# Patient Record
Sex: FEMALE | Race: BLACK OR AFRICAN AMERICAN | Employment: UNEMPLOYED | URBAN - METROPOLITAN AREA
[De-identification: names, ages, dates, MRNs, and addresses within clinical notes are randomized per-mention and may not be internally consistent; named-entity substitution may affect disease eponyms.]

---

## 2020-10-31 ENCOUNTER — HOSPITAL ENCOUNTER (OUTPATIENT)
Age: 30
Setting detail: OBSERVATION
Discharge: HOME | End: 2020-11-01
Attending: EMERGENCY MEDICINE | Admitting: INTERNAL MEDICINE
Payer: COMMERCIAL

## 2020-10-31 PROBLEM — R51.9 HEADACHE: Status: ACTIVE | Noted: 2020-10-31

## 2020-10-31 LAB
ANION GAP SERPL CALCULATED.3IONS-SCNC: 10 MMOL/L (ref 3–16)
BACTERIA: ABNORMAL /HPF
BASOPHILS ABSOLUTE: 0 K/UL (ref 0–0.2)
BASOPHILS RELATIVE PERCENT: 0.5 %
BILIRUBIN URINE: NEGATIVE
BLOOD, URINE: NEGATIVE
BUN BLDV-MCNC: 5 MG/DL (ref 7–20)
CALCIUM SERPL-MCNC: 9.3 MG/DL (ref 8.3–10.6)
CHLORIDE BLD-SCNC: 101 MMOL/L (ref 99–110)
CLARITY: CLEAR
CO2: 23 MMOL/L (ref 21–32)
COLOR: YELLOW
CREAT SERPL-MCNC: <0.5 MG/DL (ref 0.6–1.1)
EOSINOPHILS ABSOLUTE: 0.2 K/UL (ref 0–0.6)
EOSINOPHILS RELATIVE PERCENT: 1.9 %
GFR AFRICAN AMERICAN: >60
GFR NON-AFRICAN AMERICAN: >60
GLUCOSE BLD-MCNC: 79 MG/DL (ref 70–99)
GLUCOSE URINE: NEGATIVE MG/DL
HCT VFR BLD CALC: 39.2 % (ref 36–48)
HEMOGLOBIN: 13.4 G/DL (ref 12–16)
KETONES, URINE: 40 MG/DL
LEUKOCYTE ESTERASE, URINE: ABNORMAL
LYMPHOCYTES ABSOLUTE: 3.1 K/UL (ref 1–5.1)
LYMPHOCYTES RELATIVE PERCENT: 38 %
MCH RBC QN AUTO: 32.5 PG (ref 26–34)
MCHC RBC AUTO-ENTMCNC: 34.3 G/DL (ref 31–36)
MCV RBC AUTO: 94.6 FL (ref 80–100)
MICROSCOPIC EXAMINATION: YES
MONOCYTES ABSOLUTE: 0.4 K/UL (ref 0–1.3)
MONOCYTES RELATIVE PERCENT: 5.4 %
NEUTROPHILS ABSOLUTE: 4.5 K/UL (ref 1.7–7.7)
NEUTROPHILS RELATIVE PERCENT: 54.2 %
NITRITE, URINE: NEGATIVE
PDW BLD-RTO: 11.6 % (ref 12.4–15.4)
PH UA: 6.5 (ref 5–8)
PLATELET # BLD: 242 K/UL (ref 135–450)
PMV BLD AUTO: 9.4 FL (ref 5–10.5)
POTASSIUM REFLEX MAGNESIUM: 3.7 MMOL/L (ref 3.5–5.1)
PROTEIN UA: NEGATIVE MG/DL
RBC # BLD: 4.14 M/UL (ref 4–5.2)
RBC UA: ABNORMAL /HPF (ref 0–4)
SODIUM BLD-SCNC: 134 MMOL/L (ref 136–145)
SPECIFIC GRAVITY UA: 1.01 (ref 1–1.03)
URINE TYPE: ABNORMAL
UROBILINOGEN, URINE: 0.2 E.U./DL
WBC # BLD: 8.3 K/UL (ref 4–11)
WBC UA: ABNORMAL /HPF (ref 0–5)

## 2020-10-31 PROCEDURE — 2580000003 HC RX 258: Performed by: STUDENT IN AN ORGANIZED HEALTH CARE EDUCATION/TRAINING PROGRAM

## 2020-10-31 PROCEDURE — 96374 THER/PROPH/DIAG INJ IV PUSH: CPT

## 2020-10-31 PROCEDURE — 80048 BASIC METABOLIC PNL TOTAL CA: CPT

## 2020-10-31 PROCEDURE — 96361 HYDRATE IV INFUSION ADD-ON: CPT

## 2020-10-31 PROCEDURE — 85025 COMPLETE CBC W/AUTO DIFF WBC: CPT

## 2020-10-31 PROCEDURE — 6360000002 HC RX W HCPCS: Performed by: STUDENT IN AN ORGANIZED HEALTH CARE EDUCATION/TRAINING PROGRAM

## 2020-10-31 PROCEDURE — G0378 HOSPITAL OBSERVATION PER HR: HCPCS

## 2020-10-31 PROCEDURE — 81001 URINALYSIS AUTO W/SCOPE: CPT

## 2020-10-31 PROCEDURE — 99285 EMERGENCY DEPT VISIT HI MDM: CPT

## 2020-10-31 RX ORDER — METOCLOPRAMIDE HYDROCHLORIDE 5 MG/ML
10 INJECTION INTRAMUSCULAR; INTRAVENOUS ONCE
Status: COMPLETED | OUTPATIENT
Start: 2020-10-31 | End: 2020-10-31

## 2020-10-31 RX ORDER — SODIUM CHLORIDE, SODIUM LACTATE, POTASSIUM CHLORIDE, CALCIUM CHLORIDE 600; 310; 30; 20 MG/100ML; MG/100ML; MG/100ML; MG/100ML
1000 INJECTION, SOLUTION INTRAVENOUS ONCE
Status: COMPLETED | OUTPATIENT
Start: 2020-10-31 | End: 2020-10-31

## 2020-10-31 RX ORDER — UREA 10 %
LOTION (ML) TOPICAL
COMMUNITY

## 2020-10-31 RX ADMIN — METOCLOPRAMIDE 10 MG: 5 INJECTION, SOLUTION INTRAMUSCULAR; INTRAVENOUS at 21:18

## 2020-10-31 RX ADMIN — SODIUM CHLORIDE, POTASSIUM CHLORIDE, SODIUM LACTATE AND CALCIUM CHLORIDE 1000 ML: 600; 310; 30; 20 INJECTION, SOLUTION INTRAVENOUS at 21:19

## 2020-10-31 ASSESSMENT — PAIN DESCRIPTION - PAIN TYPE: TYPE: ACUTE PAIN

## 2020-10-31 ASSESSMENT — PAIN DESCRIPTION - ORIENTATION: ORIENTATION: LEFT

## 2020-10-31 ASSESSMENT — PAIN DESCRIPTION - DESCRIPTORS: DESCRIPTORS: THROBBING;NUMBNESS

## 2020-10-31 ASSESSMENT — PAIN DESCRIPTION - LOCATION: LOCATION: HEAD

## 2020-10-31 ASSESSMENT — PAIN SCALES - GENERAL: PAINLEVEL_OUTOF10: 10

## 2020-10-31 NOTE — ED TRIAGE NOTES
Pt to ED for headache since Wednesday. PT states she has been taking tylenol but it hasnt been working. Pt states her left side of her face is now in pain. Pt is 4 months pregnant.

## 2020-10-31 NOTE — ED PROVIDER NOTES
[id number] RESIDENT NOTE       Date of evaluation: 10/31/2020    Chief Complaint     Headache (left side of face hurts-headache started wednesday) and Dizziness      History of Present Illness     Josefina Irizarry is a 35 y.o. female  with no significant past medical history who presents for headache. Patient states that she has had a left-sided frontal headache for approximately 5 days now it has not been responsive to Tylenol at home. Initially she thought this was one of her typical headaches that are left-sided, but today it worsened and she noticed that she had some decrease sensation on her left upper face as well as pain on her left upper face. She has not experienced symptoms like this before. She has never been diagnosed with a headache disorder before. She also endorses photophobia associated with this. No phonophobia. She has not experienced any nausea or vomiting. She has not had any head trauma. Headache was not maximal at onset. She has had not had any weakness or numbness. Patient is approximately 13 weeks pregnant and she has had prior OB visits with a confirmed IUP per her report. She has plans to see an obstetrician here on Wednesday as she has recently moved from [place] and is transferring care. Patient has not yet tried any other treatment for their symptoms and nothing else seems to make them better or worse. Aside from the above, patient denies any aggravating or alleviating factors or associated symptoms. Review of Systems     Positive for: Headache  Negative for: Vision changes, hearing changes, weakness, numbness, chest pain, shortness of breath, abdominal pain, nausea, vomiting, fevers, chills, bowel or bladder changes  All other systems reviewed and negative, except as stated in HPI. Past Medical, Surgical, Family, and Social History     She has a past medical history of Asthma.   She has a past surgical history that includes [place] tooth extraction and Hand surgery (). Her family history is not on file. She reports that she has never smoked. She has never used smokeless tobacco. She reports previous alcohol use. She reports that she does not use drugs. Medications     Current Discharge Medication List      CONTINUE these medications which have NOT CHANGED    Details   Prenatal Multivit-Min-Fe-FA (PRE- PO) Take by mouth      Pyridoxine HCl (VITAMIN B6) 50 MG TABS Take by mouth      EQ BUDESONIDE NASAL NA by Nasal route             Allergies     She is allergic to iv dye [iodides]; nuts [peanut-containing drug products]; amoxicillin; adelso-d [diphenhydramine]; ceclor [cefaclor]; cefazolin; cephalosporins; and penicillins. Physical Exam     INITIAL VITALS: BP: 137/75, Temp: 98.8 °F (37.1 °C), Pulse: 88, Resp: 18, SpO2: 97 %     General: Age-appropriate female in no acute distress    HEENT:  Normocephalic, atraumatic. There is mild tenderness over the left upper face without obvious trauma. Patient reports decreased sensation, but does not report this on testing of light touch. Eyes: Anicteric, EOMI     Neck:  Supple, full ROM    Pulmonary:   CTA bl, no wheezes, rales, rhonchi    Cardiac:  Regular rate and rhythm, no m/r/g,     Abdomen:  Soft, nondistended, nontender    Extremities:  Warm, 2+ radial pulses    Skin: No rashes or bruises    Neuro:    - Alert and oriented to person, place, time and situation  - Follows commands readily  - Normal speech with no aphasia and no dysarthria  - No facial asymmetry  - Visual fields intact bilaterally  - EOMI bilaterally, PERRLA. CN V motor intact. SILT in V1-V3 distribution. and sensory intact. CN VII intact: face symmetric evidenced by smile and forehead wrinkle. Hearing intact bilaterally and symmetric. Symmetric palate raise without uvular deviation. Symmetric shoulder shrug. SCM 5/5 bilaterally. Tongue is midline.     - Symmetric 5/5 strength in the bilateral deltoids, biceps, triceps, wrist flexion/extension, , hip flexion, knee flex/ext, ankle dorsiflexion/plantarflexion.    - Sensation to soft touch intact in the upper and lower extremities bilaterally   - Finger-to-nose intact bilaterally, rapidly alternating movements intact bilaterally, no pronator drift, normal gait without ataxia, tandem gait intact   - Downgoing babinski bilaterally     Musculoskeletal: denies pain, recent fracture     Psych:   Mood and affect appropriate    DiagnosticResults     RADIOLOGY:  MRV HEAD WO CONTRAST    (Results Pending)       LABS:   Results for orders placed or performed during the hospital encounter of 10/31/20   CBC Auto Differential   Result Value Ref Range    WBC 8.3 4.0 - 11.0 K/uL    RBC 4.14 4.00 - 5.20 M/uL    Hemoglobin 13.4 12.0 - 16.0 g/dL    Hematocrit 39.2 36.0 - 48.0 %    MCV 94.6 80.0 - 100.0 fL    MCH 32.5 26.0 - 34.0 pg    MCHC 34.3 31.0 - 36.0 g/dL    RDW 11.6 (L) 12.4 - 15.4 %    Platelets 655 100 - 155 K/uL    MPV 9.4 5.0 - 10.5 fL    Neutrophils % 54.2 %    Lymphocytes % 38.0 %    Monocytes % 5.4 %    Eosinophils % 1.9 %    Basophils % 0.5 %    Neutrophils Absolute 4.5 1.7 - 7.7 K/uL    Lymphocytes Absolute 3.1 1.0 - 5.1 K/uL    Monocytes Absolute 0.4 0.0 - 1.3 K/uL    Eosinophils Absolute 0.2 0.0 - 0.6 K/uL    Basophils Absolute 0.0 0.0 - 0.2 K/uL   Basic Metabolic Panel w/ Reflex to MG   Result Value Ref Range    Sodium 134 (L) 136 - 145 mmol/L    Potassium reflex Magnesium 3.7 3.5 - 5.1 mmol/L    Chloride 101 99 - 110 mmol/L    CO2 23 21 - 32 mmol/L    Anion Gap 10 3 - 16    Glucose 79 70 - 99 mg/dL    BUN 5 (L) 7 - 20 mg/dL    CREATININE <0.5 (L) 0.6 - 1.1 mg/dL    GFR Non-African American >60 >60    GFR African American >60 >60    Calcium 9.3 8.3 - 10.6 mg/dL   Urinalysis, reflex to microscopic   Result Value Ref Range    Color, UA Yellow Straw/Yellow    Clarity, UA Clear Clear    Glucose, Ur Negative Negative mg/dL    Bilirubin Urine Negative Negative    Ketones, Urine 40 (A) Negative mg/dL    Specific Gravity, UA 1.010 1.005 - 1.030    Blood, Urine Negative Negative    pH, UA 6.5 5.0 - 8.0    Protein, UA Negative Negative mg/dL    Urobilinogen, Urine 0.2 <2.0 E.U./dL    Nitrite, Urine Negative Negative    Leukocyte Esterase, Urine TRACE (A) Negative    Microscopic Examination YES     Urine Type Voided    Microscopic Urinalysis   Result Value Ref Range    WBC, UA 0-2 0 - 5 /HPF    RBC, UA 0-2 0 - 4 /HPF    Bacteria, UA Rare (A) None Seen /HPF       RECENT VITALS:  BP: 110/76, Temp: 98.6 °F (37 °C), Pulse: 75,Resp: 18, SpO2: 98 %     ED Course     Nursing Notes, Past Medical Hx, Past Surgical Hx, Social Hx, Allergies, and Family Hx were reviewed. The patient was given the followingmedications:  Orders Placed This Encounter   Medications    metoclopramide (REGLAN) injection 10 mg    lactated ringers infusion 1,000 mL    sodium chloride flush 0.9 % injection 10 mL    sodium chloride flush 0.9 % injection 10 mL    OR Linked Order Group     acetaminophen (TYLENOL) tablet 650 mg     acetaminophen (TYLENOL) suppository 650 mg    OR Linked Order Group     promethazine (PHENERGAN) tablet 12.5 mg     ondansetron (ZOFRAN) injection 4 mg    enoxaparin (LOVENOX) injection 40 mg       CONSULTS:  IP CONSULT TO HOSPITALIST  IP CONSULT TO OB GYN    MEDICAL DECISION MAKING / ASSESSMENT / Vina Brunner is a 35 y.o. female with a history and presentation as described above in HPI. The patient was evaluated by myself and the ED Attending Physician, Dr. Francisco Crum. All management and disposition plans were discussed and agreed upon. Appropriate labs and diagnostic studies were reviewed as they were made available. Pertinent laboratory studies in medical decision making are listed below. Appropriate labs and diagnostic studies were reviewed as they were made available. Pertinent laboratory studies in medical decision making are listed below. Upon presentation, the patient was evaluated by me. Patient who is approximately 13 weeks pregnant presents today with 5 days of left-sided headache that has worsened and now involves photophobia, subjective left-sided facial numbness, left-sided facial pain. She has had left-sided headaches like this in the past but they have never lasted this long or involved other symptoms. She is trying Tylenol at home. Here she is hemodynamically stable and afebrile. Neurologic exam is benign making stroke less likely. Patient did not have a headache that was maximal at onset making subarachnoid hemorrhage less likely. She has no fever, leukocytosis, meningismus to suggest meningitis. Vision is intact. Renal panel here was unremarkable and CBC did not demonstrate leukocytosis or anemia. Urinalysis was also obtained that was not consistent with urinary tract infection. Given the patient's presentation, pregnancy, location of the headache, and atypical headache there was concern for venous sinus thrombosis. Discussed with patient that she has a listed allergy to contrast media and she states that she had an anaphylactic reaction to a CT scan with contrast approximately 1 year ago. I contacted radiology and it was determined that MRI venogram could not be obtained until the morning. At this time the patient will be admitted to hospital medicine for monitoring and MRI in the morning. I contacted gynecology and made them aware that the patient was here. I also obtain fetal heart tones at 151 bpm.  I ordered the MRI prior to admission and the hospitalist will follow this up. The emergency department the patient was treated with fluids and Reglan for headache with mild improvement, but not complete resolution. She had taken Tylenol less than 1 hour prior to arrival so this was not given.     The patient's ultimate disposition was: admission    At this time the patient has been admitted to hospital medicine for further evaluation and management of headache, MRI in AM. The patient will continue to be monitored here in the emergency department until which time she is moved to her new treatment location. Clinical Impression     1. Acute intractable headache, unspecified headache type        Disposition     PATIENT REFERRED TO:  No follow-up provider specified.     DISCHARGE MEDICATIONS:  Current Discharge Medication List          DISPOSITION Admitted 10/31/2020 11:21:52 PM      MD Chitra Oquendo MD  11/01/20 0740

## 2020-11-01 ENCOUNTER — APPOINTMENT (OUTPATIENT)
Dept: MRI IMAGING | Age: 30
End: 2020-11-01
Payer: COMMERCIAL

## 2020-11-01 VITALS
HEART RATE: 79 BPM | TEMPERATURE: 98.5 F | SYSTOLIC BLOOD PRESSURE: 110 MMHG | OXYGEN SATURATION: 100 % | HEIGHT: 63 IN | BODY MASS INDEX: 33.66 KG/M2 | RESPIRATION RATE: 16 BRPM | WEIGHT: 190 LBS | DIASTOLIC BLOOD PRESSURE: 74 MMHG

## 2020-11-01 PROBLEM — R51.9 HEADACHE: Status: RESOLVED | Noted: 2020-10-31 | Resolved: 2020-11-01

## 2020-11-01 LAB
ANION GAP SERPL CALCULATED.3IONS-SCNC: 11 MMOL/L (ref 3–16)
BASOPHILS ABSOLUTE: 0 K/UL (ref 0–0.2)
BASOPHILS RELATIVE PERCENT: 0.3 %
BUN BLDV-MCNC: 6 MG/DL (ref 7–20)
CALCIUM SERPL-MCNC: 8.7 MG/DL (ref 8.3–10.6)
CHLORIDE BLD-SCNC: 105 MMOL/L (ref 99–110)
CO2: 19 MMOL/L (ref 21–32)
CREAT SERPL-MCNC: <0.5 MG/DL (ref 0.6–1.1)
EOSINOPHILS ABSOLUTE: 0.2 K/UL (ref 0–0.6)
EOSINOPHILS RELATIVE PERCENT: 3.1 %
GFR AFRICAN AMERICAN: >60
GFR NON-AFRICAN AMERICAN: >60
GLUCOSE BLD-MCNC: 76 MG/DL (ref 70–99)
HCT VFR BLD CALC: 36 % (ref 36–48)
HEMOGLOBIN: 12.3 G/DL (ref 12–16)
LYMPHOCYTES ABSOLUTE: 2.3 K/UL (ref 1–5.1)
LYMPHOCYTES RELATIVE PERCENT: 36.1 %
MCH RBC QN AUTO: 32.7 PG (ref 26–34)
MCHC RBC AUTO-ENTMCNC: 34.2 G/DL (ref 31–36)
MCV RBC AUTO: 95.7 FL (ref 80–100)
MONOCYTES ABSOLUTE: 0.4 K/UL (ref 0–1.3)
MONOCYTES RELATIVE PERCENT: 5.8 %
NEUTROPHILS ABSOLUTE: 3.5 K/UL (ref 1.7–7.7)
NEUTROPHILS RELATIVE PERCENT: 54.7 %
PDW BLD-RTO: 11.4 % (ref 12.4–15.4)
PLATELET # BLD: 212 K/UL (ref 135–450)
PMV BLD AUTO: 10 FL (ref 5–10.5)
POTASSIUM REFLEX MAGNESIUM: 4 MMOL/L (ref 3.5–5.1)
RBC # BLD: 3.76 M/UL (ref 4–5.2)
SODIUM BLD-SCNC: 135 MMOL/L (ref 136–145)
WBC # BLD: 6.4 K/UL (ref 4–11)

## 2020-11-01 PROCEDURE — G0378 HOSPITAL OBSERVATION PER HR: HCPCS

## 2020-11-01 PROCEDURE — 80048 BASIC METABOLIC PNL TOTAL CA: CPT

## 2020-11-01 PROCEDURE — 36415 COLL VENOUS BLD VENIPUNCTURE: CPT

## 2020-11-01 PROCEDURE — 6370000000 HC RX 637 (ALT 250 FOR IP): Performed by: INTERNAL MEDICINE

## 2020-11-01 PROCEDURE — 70544 MR ANGIOGRAPHY HEAD W/O DYE: CPT

## 2020-11-01 PROCEDURE — 85025 COMPLETE CBC W/AUTO DIFF WBC: CPT

## 2020-11-01 RX ORDER — SODIUM CHLORIDE 0.9 % (FLUSH) 0.9 %
10 SYRINGE (ML) INJECTION PRN
Status: DISCONTINUED | OUTPATIENT
Start: 2020-11-01 | End: 2020-11-01 | Stop reason: HOSPADM

## 2020-11-01 RX ORDER — ACETAMINOPHEN 325 MG/1
650 TABLET ORAL EVERY 6 HOURS PRN
Status: DISCONTINUED | OUTPATIENT
Start: 2020-11-01 | End: 2020-11-01 | Stop reason: HOSPADM

## 2020-11-01 RX ORDER — ONDANSETRON 2 MG/ML
4 INJECTION INTRAMUSCULAR; INTRAVENOUS EVERY 6 HOURS PRN
Status: DISCONTINUED | OUTPATIENT
Start: 2020-11-01 | End: 2020-11-01 | Stop reason: HOSPADM

## 2020-11-01 RX ORDER — SODIUM CHLORIDE 0.9 % (FLUSH) 0.9 %
10 SYRINGE (ML) INJECTION EVERY 12 HOURS SCHEDULED
Status: DISCONTINUED | OUTPATIENT
Start: 2020-11-01 | End: 2020-11-01 | Stop reason: HOSPADM

## 2020-11-01 RX ORDER — ACETAMINOPHEN 500 MG
1000 TABLET ORAL EVERY 8 HOURS PRN
Qty: 30 TABLET | Refills: 0
Start: 2020-11-01

## 2020-11-01 RX ORDER — PROMETHAZINE HYDROCHLORIDE 12.5 MG/1
12.5 TABLET ORAL EVERY 6 HOURS PRN
Status: DISCONTINUED | OUTPATIENT
Start: 2020-11-01 | End: 2020-11-01 | Stop reason: HOSPADM

## 2020-11-01 RX ORDER — ACETAMINOPHEN 650 MG/1
650 SUPPOSITORY RECTAL EVERY 6 HOURS PRN
Status: DISCONTINUED | OUTPATIENT
Start: 2020-11-01 | End: 2020-11-01 | Stop reason: HOSPADM

## 2020-11-01 RX ADMIN — ACETAMINOPHEN 650 MG: 325 TABLET ORAL at 15:44

## 2020-11-01 ASSESSMENT — PAIN SCALES - GENERAL
PAINLEVEL_OUTOF10: 3
PAINLEVEL_OUTOF10: 3

## 2020-11-01 ASSESSMENT — PAIN DESCRIPTION - ONSET: ONSET: ON-GOING

## 2020-11-01 ASSESSMENT — PAIN DESCRIPTION - DESCRIPTORS: DESCRIPTORS: THROBBING

## 2020-11-01 ASSESSMENT — PAIN DESCRIPTION - PROGRESSION: CLINICAL_PROGRESSION: GRADUALLY WORSENING

## 2020-11-01 ASSESSMENT — PAIN DESCRIPTION - PAIN TYPE: TYPE: ACUTE PAIN

## 2020-11-01 ASSESSMENT — PAIN - FUNCTIONAL ASSESSMENT: PAIN_FUNCTIONAL_ASSESSMENT: PREVENTS OR INTERFERES SOME ACTIVE ACTIVITIES AND ADLS

## 2020-11-01 ASSESSMENT — PAIN DESCRIPTION - LOCATION: LOCATION: HEAD

## 2020-11-01 ASSESSMENT — PAIN DESCRIPTION - FREQUENCY: FREQUENCY: INTERMITTENT

## 2020-11-01 NOTE — PROGRESS NOTES
4 Eyes Admission Assessment     I agree as the admission nurse that 2 RN's have performed a thorough Head to Toe Skin Assessment on the patient. ALL assessment sites listed below have been assessed on admission. Areas assessed by both nurses:   [x]   Head, Face, and Ears   [x]   Shoulders, Back, and Chest  [x]   Arms, Elbows, and Hands   [x]   Coccyx, Sacrum, and Ischium  [x]   Legs, Feet, and Heels        Does the Patient have Skin Breakdown?   No         Hank Prevention initiated:  No   Wound Care Orders initiated:  No      [facility] nurse consulted for Pressure Injury (Stage 3,4, Unstageable, DTI, NWPT, and Complex wounds) or Hank score 18 or lower:  No      Nurse 1 eSignature: Electronically signed by Almita Rae RN on 11/1/20 at 12:42 AM EDT    **SHARE this note so that the co-signing nurse is able to place an eSignature**    Nurse 2 eSignature: Electronically signed by Charbel Bernal RN on 11/1/20 at 12:42 AM EDT

## 2020-11-01 NOTE — ED PROVIDER NOTES
ED Attending Attestation Note     Date of evaluation: 10/31/2020    This patient was seen by the resident. I have seen and examined the patient, agree with the workup, evaluation, management and diagnosis. The care plan has been discussed. My assessment reveals nonfocal neuro exam with normal cranial nerves.      Spencer Benítez MD  10/31/20 2729

## 2020-11-01 NOTE — DISCHARGE SUMMARY
Hospital Discharge Summary    Patient's PCP: No primary care provider on file. Admit Date: 10/31/2020   Discharge Date: 11/1/2020    Admitting Physician: Dr. Tomás Drummond MD  Discharge Physician: Dr. Monet Nielsen: none    HPI: 35 y.o. female who presents with complaints of constant headache for the past 4 days. Patient has been taking extra strength Tylenol 1 tablet 4 times a day with no relief. Patient is G2, P1 with POA of 14 weeks  Patient has worsening GERD, vomiting-random spots even before she started having the headache and pain in the left side of the face especially left retro-orbital.  Denies fever, chills, changes in vision, focal weakness or paresthesias. No seizure-like activity. Mentating normal     Patient has history of migraine which last only 1-1/2 days in contrast to current headaches which has been going on for the past 4 days continuously.     Denies prior history of DVT/PE. No family history of thromboembolic disease           Left-sided headache,  Likely sec to migraines  - Resolved: no headache now  - No neuro s/s  - MRV: No evidence of dural venous thrombosis. - Requesting discharge to go be with her 9year old daughter.      - Continue tylenol which she states helps her headaches. - Will need f/u with neurology, as may need suppressive medication for migraines  - Continue prenatal vitamins  - Recommended frequent small meal portions  - Supportive therapy          Discharge Diagnoses:   Patient Active Problem List   Diagnosis   (none) - all problems resolved or deleted       Physical Exam: /74   Pulse 79   Temp 98.5 °F (36.9 °C) (Oral)   Resp 16   Ht 5' 3\" (1.6 m)   Wt 190 lb (86.2 kg)   SpO2 100%   BMI 33.66 kg/m²     No results for input(s): POCGLU in the last 72 hours. General appearance:  No apparent distress, appears stated age and cooperative. Respiratory:  Normal respiratory effort.  Clear to auscultation, bilaterally  Cardiovascular: Regular rate and rhythm with normal S1/S2 without murmurs  Abdomen: Soft, non-tender, non-distended with normal bowel sounds Pregnant, about 16 week size. Skin: Skin color, texture, turgor normal.  No rashes or lesions. Neurologic:  Grossly non-focal.  Psychiatric:  Alert and oriented, thought content appropriate, normal insight  Capillary Refill: Brisk,< 3 seconds   Peripheral Pulses: +2 palpable, equal bilaterally       LABS:  Recent Labs     20  0537   WBC 6.4   HGB 12.3         Recent Labs     20  0537   *   K 4.0      CO2 19*   BUN 6*   CREATININE <0.5*   GLUCOSE 76     No results for input(s): INR in the last 72 hours. Discharge Medications:   Migdalia Stein   Home Medication Instructions [id number]    Printed on:20 8620   Medication Information                      acetaminophen (TYLENOL) 500 MG tablet  Take 2 tablets by mouth every 8 hours as needed for Pain (headaches.)             EQ BUDESONIDE NASAL NA  by Nasal route             Prenatal Multivit-Min-Fe-FA (PRE- PO)  Take by mouth             Pyridoxine HCl (VITAMIN B6) 50 MG TABS  Take by mouth                Activity: activity as tolerated  Diet: Frequent small meals      Disposition: home  Discharged Condition: Stable  Follow Up: Primary Care Physician in one week  - OB/GYN: call for appt. - Neurology in 1 week. Total time spent on discharge, finalizing medications, referrals and arranging outpatient follow up was more than 30 minutes    Thank you Dr. Roberts primary care provider on file. for the opportunity to be involved in this patients care. If you have any questions or concerns please feel free to contact me at [phone].

## 2020-11-01 NOTE — PROGRESS NOTES
Patient admitted to 26 with all belongings. Oriented to room. Alert and oriented x4. Up SBA. No c/o pain or nausea at this time. Fall precautions in place, call light within reach, bed alarm on, bed in lowest position, and non skid socks on. VSS. Denies needs at this time. Will continue to monitor.

## 2020-11-01 NOTE — CARE COORDINATION
Services Needed    LOC at discharge: Not Applicable  [facility]LIN Completed: Not Indicated    Notification completed in HENS/PAS?:  Not Applicable    IMM Completed:   Not Indicated    Transportation:  Transportation PLAN for discharge: family   Mode of Transport: [facility]  ordered at discharge: Not [address]: Not Applicable  Orders faxed: No    Durable Medical Equipment:  DME Provider: none  Equipment obtained during hospitalization:     Home Oxygen and Respiratory Equipment:  Oxygen needed at discharge?: Not [address]: Not Applicable  Portable tank available for discharge?: Not Indicated    Dialysis:  Dialysis patient: No    Dialysis Center:  Not Applicable    Additional CM Notes: Pt from home with family will return home no needs at DC    The Plan for Transition of Care is related to the following treatment goals of Headache [R51.9]  Headache [R51.9]    The Patient and/or patient representative Donell Jade and her family were provided with a choice of provider and agrees with the discharge plan Yes    Freedom of choice list was provided with basic dialogue that supports the patient's individualized plan of care/goals and shares the quality data associated with the providers.  Not Indicated    Care Transitions patient: No    Raymundo May RN  [facility]  Case Management Department  Ph: [phone]  Fax: [phone]

## 2020-11-01 NOTE — H&P
Start Date     Does device contain nicotine? Quit Date     Vaping Type           Family History:    Reviewed in detail and negative for DM, CAD, Cancer, CVA. Positive as follows:    History reviewed. No pertinent family history. REVIEW OF SYSTEMS:   Pertinent positives as noted in the HPI. All other systems reviewed and negative. PHYSICAL EXAM PERFORMED:    /75   Pulse 88   Temp 98.8 °F (37.1 °C) (Oral)   Resp 18   Ht 5' 3\" (1.6 m)   Wt 190 lb (86.2 kg)   SpO2 97%   BMI 33.66 kg/m²     General appearance:  No apparent distress, appears stated age and cooperative. HEENT:  Normal cephalic, atraumatic without obvious deformity. Pupils equal, round, and reactive to light. Extra ocular muscles intact. Conjunctivae/corneas clear. Neck: Supple, with full range of motion. No jugular venous distention. Trachea midline. Respiratory:  Normal respiratory effort. Clear to auscultation, bilaterally without Rales/Wheezes/Rhonchi. Cardiovascular:  Regular rate and rhythm with normal S1/S2 without murmurs, rubs or gallops. Abdomen: Soft, non-tender, non-distended with normal bowel sounds. Musculoskeletal:  No clubbing, cyanosis or edema bilaterally. Full range of motion without deformity. Skin: Skin color, texture, turgor normal.  No rashes or lesions. Neurologic:  Neurovascularly intact without any focal sensory/motor deficits. Cranial nerves: II-XII intact, grossly non-focal.  Psychiatric:  Alert and oriented, thought content appropriate, normal insight  Capillary Refill: Brisk,< 3 seconds   Peripheral Pulses: +2 palpable, equal bilaterally       Labs:     Recent Labs     10/31/20  2130   WBC 8.3   HGB 13.4   HCT 39.2        Recent Labs     10/31/20  2130   *   K 3.7      CO2 23   BUN 5*   CREATININE <0.5*   CALCIUM 9.3     No results for input(s): AST, ALT, BILIDIR, BILITOT, ALKPHOS in the last 72 hours. No results for input(s): INR in the last 72 hours.   No results for input(s): Christianne Scott in the last 72 hours. Urinalysis:      Lab Results   Component Value Date    NITRU Negative 10/31/2020    WBCUA 0-2 10/31/2020    BACTERIA Rare 10/31/2020    RBCUA 0-2 10/31/2020    BLOODU Negative 10/31/2020    SPECGRAV 1.010 10/31/2020    GLUCOSEU Negative 10/31/2020       Radiology:     CXR: I have reviewed the CXR with the following interpretation: N/A  EKG:  I have reviewed the EKG with the following interpretation: N/A    MRV HEAD WO CONTRAST    (Results Pending)       ASSESSMENT:    Active Hospital Problems    Diagnosis Date Noted    Headache [R51.9] 10/31/2020   - Left-sided headache, concern for venous sinus thrombosis given the pregnancy status  - History of migraine headaches  - G2, P1  - Worsening GERD symptoms    PLAN:  Continue antiemetics as needed  Pain relief  MRV head in a.m. to rule out venous sinus thrombosis  Continue prenatal vitamins  Recommended frequent small meal portions  Supportive therapy    DVT Prophylaxis: Lovenox  Diet: DIET GENERAL;  Code Status: Full Code    PT/OT Eval Status: As tolerated    Dispo -GMF with telemetry       Spike Machuca MD    Thank you No primary care provider on file. for the opportunity to be involved in this patient's care. If you have any questions or concerns please feel free to contact me at [phone].